# Patient Record
(demographics unavailable — no encounter records)

---

## 2025-04-29 NOTE — PROCEDURE
[Large Joint Injection] : Large joint injection [Bilateral] : bilaterally of the [Knee] : knee [Pain] : pain [Alcohol] : alcohol [Betadine] : betadine [Ethyl Chloride sprayed topically] : ethyl chloride sprayed topically [Sterile technique used] : sterile technique used [Euflexxa(20mg)] : 20mg of Euflexxa [#1] : series #1 [] : Patient tolerated procedure well [Patient was advised to rest the joint(s) for ____ days] : patient was advised to rest the joint(s) for [unfilled] days [Altered anatomic landmarks d/t erosive arthritis] : altered anatomic landmarks d/t erosive arthritis [All ultrasound images have been permanently captured and stored accordingly in our picture archiving and communication system] : All ultrasound images have been permanently captured and stored accordingly in our picture archiving and communication system [FreeTextEntry3] : Do not submerge underwater for 24 hours

## 2025-04-29 NOTE — IMAGING
[Bilateral] : knee bilaterally [FreeTextEntry9] : Reviewed and interpreted.  Right knee AP standing, lateral, sunrise views-moderate degenerative changes with narrowing of the medial compartment on AP standing view, spurring patellofemoral joint  Reviewed and interpreted.  Left knee AP standing, lateral, sunrise views-mild to moderate degenerative changes with narrowing of the medial compartment on AP standing view, spurring patellofemoral joint [de-identified] : Mild dystrophic gait  Cervical spine Inspection normal Good active range of motion without pain Palpable area of fullness right mid neck region area measuring 3 x 3 cm.  No tenderness  Right knee No swelling Mild medial facet and joint line tenderness Passive range of motion 0 degrees to 125 degrees  Right leg No swelling Calf is soft and nontender  Left knee No swelling Mild medial facet and joint line tenderness Passive range of motion 0 degrees to 125 degrees  Left leg No swelling Calf is soft and nontender

## 2025-04-29 NOTE — DISCUSSION/SUMMARY
[de-identified] : Ice prn Tylenol prn.  Advil prn Risk benefits and alternatives of prescribed medication(s) were discussed with the patient. Side effects were discussed including risks of GI irritation and bleeding. Questions were answered. Discontinue medication if any issues. To call me if any questions or concerns He will avoid irritating activities Again, I discussed Euflexxa injections for his knees. Risks including infection, swelling, stiffness, bleeding in addition to other associated risks with joint injection, benefits and alternatives were discussed with the patient He would like to do this He will have MRI of the soft tissues of the neck  Impression: Right cervical mass Moderate osteoarthritis right knee Mild to moderate osteoarthritis left knee

## 2025-04-29 NOTE — HISTORY OF PRESENT ILLNESS
[Gradual] : gradual [4] : 4 [Dull/Aching] : dull/aching [Radiating] : radiating [Leisure] : leisure [Rest] : rest [Stairs] : stairs [Retired] : Work status: retired [1] : 1 [Euflexxa] : Euflexxa [de-identified] : The patient has continued pain in both knees..  The right knee bothers him more than the left.  He has mild pain standing and walking.  Mild to moderate pain with stairs movie sign.   The patient has noticed soft tissue mass in his right posterior neck region over the past several months.  No pain in this region. [] : Post Surgical Visit: no [FreeTextEntry1] : B Knee  [FreeTextEntry7] : B Lower Legs  [de-identified] : 1/10/2024 [de-identified] : Dr. Rojas

## 2025-05-06 NOTE — HISTORY OF PRESENT ILLNESS
[Gradual] : gradual [4] : 4 [Dull/Aching] : dull/aching [Radiating] : radiating [Leisure] : leisure [Rest] : rest [Stairs] : stairs [Retired] : Work status: retired [2] : 2 [Euflexxa] : Euflexxa [de-identified] : The patient feels little better after the first Euflexxa injections in both knees.  He has mild pain standing and walking [] : Post Surgical Visit: no [FreeTextEntry1] : B Knee  [FreeTextEntry7] : B Lower Legs  [de-identified] : 1/10/2024 [de-identified] : Dr. Rojas  [de-identified] : 4/29/2025

## 2025-05-06 NOTE — PROCEDURE
[Large Joint Injection] : Large joint injection [Bilateral] : bilaterally of the [Knee] : knee [Pain] : pain [Alcohol] : alcohol [Betadine] : betadine [Ethyl Chloride sprayed topically] : ethyl chloride sprayed topically [Sterile technique used] : sterile technique used [Euflexxa(20mg)] : 20mg of Euflexxa [] : Patient tolerated procedure well [Patient was advised to rest the joint(s) for ____ days] : patient was advised to rest the joint(s) for [unfilled] days [Altered anatomic landmarks d/t erosive arthritis] : altered anatomic landmarks d/t erosive arthritis [All ultrasound images have been permanently captured and stored accordingly in our picture archiving and communication system] : All ultrasound images have been permanently captured and stored accordingly in our picture archiving and communication system [#2] : series #2 [FreeTextEntry3] : Do not submerge underwater for 24 hours

## 2025-05-06 NOTE — DISCUSSION/SUMMARY
[de-identified] : Ice prn Tylenol prn.  Advil prn Risk benefits and alternatives of prescribed medication(s) were discussed with the patient. Side effects were discussed including risks of GI irritation and bleeding. Questions were answered. Discontinue medication if any issues. To call me if any questions or concerns He will avoid irritating activities He will have MRI of the soft tissues of the neck.  He is scheduled to have this done tomorrow  Impression: Right cervical mass Moderate osteoarthritis right knee Mild to moderate osteoarthritis left knee

## 2025-05-06 NOTE — IMAGING
[de-identified] : Mild dystrophic gait  Right knee No swelling Mild medial facet and joint line tenderness Passive range of motion 0 degrees to 125 degrees  Right leg No swelling Calf is soft and nontender  Left knee No swelling Mild medial facet and joint line tenderness Passive range of motion 0 degrees to 125 degrees  Left leg No swelling Calf is soft and nontender

## 2025-05-13 NOTE — PROCEDURE
[Large Joint Injection] : Large joint injection [Bilateral] : bilaterally of the [Knee] : knee [Pain] : pain [Alcohol] : alcohol [Betadine] : betadine [Ethyl Chloride sprayed topically] : ethyl chloride sprayed topically [Sterile technique used] : sterile technique used [Euflexxa(20mg)] : 20mg of Euflexxa [] : Patient tolerated procedure well [Patient was advised to rest the joint(s) for ____ days] : patient was advised to rest the joint(s) for [unfilled] days [Altered anatomic landmarks d/t erosive arthritis] : altered anatomic landmarks d/t erosive arthritis [All ultrasound images have been permanently captured and stored accordingly in our picture archiving and communication system] : All ultrasound images have been permanently captured and stored accordingly in our picture archiving and communication system [#3] : series #3 [FreeTextEntry3] : Do not submerge underwater for 24 hours Left knee injection was given medially

## 2025-05-13 NOTE — DATA REVIEWED
[MRI] : MRI [Cervical Spine] : cervical spine [I independently reviewed and interpreted images and report] : I independently reviewed and interpreted images and report [FreeTextEntry1] : MRI of the neck, soft tissues done at [facility] May 7, 2025 was reviewed. There was no evidence of mass right posterior neck region. There is multilevel degenerative disc disease. Mild broad disc/osteophyte at C3-4 with mild stenosis. Mild broad disc/osteophyte at C5-6. Mild to moderate provide disc/osteophyte at C6-7 with moderate stenosis

## 2025-05-13 NOTE — HISTORY OF PRESENT ILLNESS
[Gradual] : gradual [4] : 4 [Dull/Aching] : dull/aching [Radiating] : radiating [Leisure] : leisure [Rest] : rest [Stairs] : stairs [Retired] : Work status: retired [3] : 3 [Euflexxa] : Euflexxa [de-identified] : The patient feels little better after the second Euflexxa injections in both knees.  He has mild pain standing and walking He had MRI cervical spine.  He denies any pain in his neck.  No radicular complaints [] : Post Surgical Visit: no [FreeTextEntry1] : B Knee  [FreeTextEntry7] : B Lower Legs  [de-identified] : 1/10/2024 [de-identified] : Dr. Rojas  [de-identified] : 5/06/25

## 2025-05-13 NOTE — DISCUSSION/SUMMARY
[de-identified] : MRI of the soft tissues of the cervical spine was discussed with the patient He will return prn if he develops any neck pain or develops any increased swelling Ice prn Tylenol prn.  Advil prn Risk benefits and alternatives of prescribed medication(s) were discussed with the patient. Side effects were discussed including risks of GI irritation and bleeding. Questions were answered. Discontinue medication if any issues. To call me if any questions or concerns He will avoid irritating activities   Impression: Cervical spondylosis Moderate osteoarthritis right knee Mild to moderate osteoarthritis left knee

## 2025-05-13 NOTE — IMAGING
[de-identified] : Mild dystrophic gait   Cervical spine Inspection normal Good active range of motion without pain Palpable area of fullness right mid neck region area measuring 3 x 3 cm. No tenderness   Right knee No swelling Mild medial facet and joint line tenderness Passive range of motion 0 degrees to 125 degrees  Right leg No swelling Calf is soft and nontender  Left knee No swelling.  4 mm area of purplish discoloration lateral knee in region of prior injection Mild medial facet and joint line tenderness Passive range of motion 0 degrees to 125 degrees  Left leg No swelling Calf is soft and nontender

## 2025-06-24 NOTE — IMAGING
[de-identified] : Mild dystrophic gait  Lumbosacral spine Inspection-normal Tenderness-mild tenderness paraspinals bilaterally Straight leg raising-negative bilaterally Motor exam lower extremities-normal  Right hip Full painless passive range of motion. No tenderness  Left hip No swelling Full painless passive range of motion.  Mild tenderness posterior gluteal region  Right knee No swelling Mild medial facet and joint line tenderness Passive range of motion 0 degrees to 125 degrees   Left knee No swelling.  Mild medial facet tenderness Passive range of motion 0 degrees to 125 degrees  Both legs No swelling Calves are soft and nontender Posterior tibial pulse 2+ bilaterally  [FreeTextEntry1] : Reviewed and interpreted.  Lumbosacral spine AP and lateral views-mild to moderate multilevel disc space narrowing.  Anterior bridging osteophytes [de-identified] : Reviewed and interpreted.  Pelvis AP with lateral both hips-mild degenerative changes of the hips bilaterally

## 2025-06-24 NOTE — DISCUSSION/SUMMARY
[de-identified] : Ice prn Tylenol prn.  Advil with food prn Risk benefits and alternatives of prescribed medication(s) were discussed with the patient. Side effects were discussed including risks of GI irritation and bleeding. Questions were answered. Discontinue medication if any issues. To call me if any questions or concerns He will have MRIs of the lumbosacral spine and pelvis Referred to Tevin Monteiro at [facility] for therapy program   Impression: Lumbosacral strain/spondylosis Gluteal tendinitis left hip Moderate osteoarthritis right knee Mild to moderate osteoarthritis left knee

## 2025-06-24 NOTE — REASON FOR VISIT
[FreeTextEntry2] : Decreased pain in both knees Pain lower back and left gluteal region over the past several months

## 2025-06-24 NOTE — HISTORY OF PRESENT ILLNESS
[Lower back] : lower back [Left Leg] : left leg [Right Leg] : right leg [Gradual] : gradual [4] : 4 [1] : 2 [Burning] : burning [Dull/Aching] : dull/aching [Radiating] : radiating [Sharp] : sharp [Retired] : Work status: retired [de-identified] : The patient says his knees are feeling better after completing Euflexxa injections.  The right knee bothers him more than the left.  He has mild pain standing and walking occasionally.  Pain after sitting and getting up The patient has had pain in his lower back and posterior left gluteal region over the past several months.  He had fall with injury to his left posterior hip several years ago. He has mild to moderate pain with change of position.  Occasional pain rating to his thighs with burning.  Occasional pain awakening him from sleep at night.  No numbness or weakness in lower extremities [] : Post Surgical Visit: no [FreeTextEntry7] : B Legs

## 2025-07-23 NOTE — ASSESSMENT
[FreeTextEntry1] : Diabetes/obesity-most recent A1c was 5.5. For now he will continue Zepbound 7.5 mg weekly.  Hyperlipidemia-cholesterol is elevated once again off medication. He is going to restart Crestor 10 mg and Nexletol.  Nexletol was started because of possible myalgia from higher doses of rosuvastatin.  Chronic depression-has been on Cymbalta 60 mg daily for years and doing well with it.  Sleep apnea/CPAP  Asthma-does use Singulair 10 mg daily.  Last colonoscopy was in 2020 and that was a normal exam.  5-year follow-up was suggested. [Vaccines Reviewed] : Immunizations reviewed today. Please see immunization details in the vaccine log within the immunization flowsheet.

## 2025-07-23 NOTE — PHYSICAL EXAM
[No Acute Distress] : no acute distress [No JVD] : no jugular venous distention [Clear to Auscultation] : lungs were clear to auscultation bilaterally [Regular Rhythm] : with a regular rhythm [Normal S1, S2] : normal S1 and S2 [No Murmur] : no murmur heard [No Edema] : there was no peripheral edema [No Focal Deficits] : no focal deficits [Alert and Oriented x3] : oriented to person, place, and time [de-identified] : Benign

## 2025-07-23 NOTE — HISTORY OF PRESENT ILLNESS
[de-identified] : 65-year-old male presents for annual wellness visit, review fasting labs and medication management. He has a history of prediabetes, hyperlipidemia, asthma, sleep apnea/CPAP, long-term depression and obesity. Has been on Zepbound since 5/25 she was cardiologist and has lost approximately 30 pounds. Was on rosuvastatin and Nexletol for hyperlipidemia but stopped them a few months ago to see if his cholesterol could be controlled with diet and weight loss. Has been generally well without recent illness. Last colonoscopy was in 2020. He is /male partner.  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

## 2025-07-23 NOTE — HEALTH RISK ASSESSMENT
[Monthly or less (1 pt)] : Monthly or less (1 point) [1 or 2 (0 pts)] : 1 or 2 (0 points) [Never (0 pts)] : Never (0 points) [No] : In the past 12 months have you used drugs other than those required for medical reasons? No [No falls in past year] : Patient reported no falls in the past year [0] : 2) Feeling down, depressed, or hopeless: Not at all (0) [PHQ-2 Negative - No further assessment needed] : PHQ-2 Negative - No further assessment needed [Audit-CScore] : 1 [[id number]] : 0 [Yes] : Reviewed medication list for presence of high-risk medications. [Opioids] : opioids [Never] : Never [Patient reported colonoscopy was normal] : Patient reported colonoscopy was normal [Change in mental status noted] : No change in mental status noted [Language] : denies difficulty with language [Behavior] : denies difficulty with behavior [Learning/Retaining New Information] : denies difficulty learning/retaining new information [Handling Complex Tasks] : denies difficulty handling complex tasks [Reasoning] : denies difficulty with reasoning [Spatial Ability and Orientation] : denies difficulty with spatial ability and orientation [None] : None [With Significant Other] : lives with significant other [Retired] : retired [College] : College [] :  [Feels Safe at Home] : Feels safe at home [Fully functional (bathing, dressing, toileting, transferring, walking, feeding)] : Fully functional (bathing, dressing, toileting, transferring, walking, feeding) [Fully functional (using the telephone, shopping, preparing meals, housekeeping, doing laundry, using] : Fully functional and needs no help or supervision to perform IADLs (using the telephone, shopping, preparing meals, housekeeping, doing laundry, using transportation, managing medications and managing finances) [Reports changes in hearing] : Reports no changes in hearing [Reports changes in vision] : Reports no changes in vision [Reports normal functional visual acuity (ie: able to read med bottle)] : Reports normal functional visual acuity [Reports changes in dental health] : Reports no changes in dental health [Smoke Detector] : smoke detector [Seat Belt] :  uses seat belt [Travel to Developing Areas] : does not  travel to developing areas [TB Exposure] : is not being exposed to tuberculosis [ColonoscopyDate] : 06/20

## 2025-07-30 NOTE — DATA REVIEWED
[Lumbar Spine] : lumbar spine [MRI] : MRI [Pelvis] : pelvis [I independently reviewed and interpreted images and report] : I independently reviewed and interpreted images and report [FreeTextEntry1] : MRI lumbosacral spine done in [facility] July 7, 2025 was reviewed. There is multilevel degenerative disc disease. Mild stenosis at L4-5 [FreeTextEntry2] : MRI of the pelvis done July 7, 2025 was reviewed. There is mild bilateral trochanteric bursitis. Also reported as asymmetric fat versus poorly encapsulated lipoma along distal portion of proximal left piriformis muscle. Reported is measuring 2.3 x 1.9 x 1.6 cm. No edema or atrophy

## 2025-07-30 NOTE — IMAGING
[de-identified] : Reviewed and interpreted.  Pelvis AP with lateral both hips-mild degenerative changes of the hips bilaterally [Left] : left elbow [de-identified] : Mild dystrophic gait  Left elbow No swelling Full active motion Palpable clicking with active flexion extension, pronation supination Mild tenderness posterolateral elbow Collaterals are stable Radial pulse 2+   Lumbosacral spine Inspection-normal Tenderness-no tenderness Straight leg raising-negative bilaterally Motor exam lower extremities-normal  Right hip Full painless passive range of motion. No tenderness  Left hip No swelling Full painless passive range of motion.  No tenderness   Both legs No swelling Calves are soft and nontender Posterior tibial pulse 2+ bilaterally  [FreeTextEntry1] : Reviewed and interpreted.  Left elbow AP, lateral and oblique views-minimal degenerative changes.  Small ossicle posterior olecranon

## 2025-07-30 NOTE — IMAGING
[de-identified] : Reviewed and interpreted.  Pelvis AP with lateral both hips-mild degenerative changes of the hips bilaterally [Left] : left elbow [de-identified] : Mild dystrophic gait  Left elbow No swelling Full active motion Palpable clicking with active flexion extension, pronation supination Mild tenderness posterolateral elbow Collaterals are stable Radial pulse 2+   Lumbosacral spine Inspection-normal Tenderness-no tenderness Straight leg raising-negative bilaterally Motor exam lower extremities-normal  Right hip Full painless passive range of motion. No tenderness  Left hip No swelling Full painless passive range of motion.  No tenderness   Both legs No swelling Calves are soft and nontender Posterior tibial pulse 2+ bilaterally  [FreeTextEntry1] : Reviewed and interpreted.  Left elbow AP, lateral and oblique views-minimal degenerative changes.  Small ossicle posterior olecranon

## 2025-07-30 NOTE — HISTORY OF PRESENT ILLNESS
[Lower back] : lower back [Left Leg] : left leg [Right Leg] : right leg [Gradual] : gradual [1] : 2 [Burning] : burning [Dull/Aching] : dull/aching [Radiating] : radiating [Sharp] : sharp [Retired] : Work status: retired [de-identified] : The patient says that since last visit his lower back and left hip pain has improved.  He has minimal pain in his lower back and left posterior gluteal region with activities.  No radicular complaints.  No numbness or weakness in his lower extremities.  No awakening from sleep at night.  He had MRIs lumbosacral spine and pelvis The patient has had pain in his left elbow over the past several months.  No injury.  He has clicking associated with the pain.  No catching or locking.  Pain with pronation and supination.  Pain when lifting and grasping. [] : Post Surgical Visit: no [FreeTextEntry5] : Patient returns for follow up, has MRI done [FreeTextEntry7] : B Legs [de-identified] : MRI

## 2025-07-30 NOTE — DATA REVIEWED
[Lumbar Spine] : lumbar spine [MRI] : MRI [Pelvis] : pelvis [I independently reviewed and interpreted images and report] : I independently reviewed and interpreted images and report [FreeTextEntry1] : MRI lumbosacral spine done in [facility] July 7, 2025 was reviewed. There is multilevel degenerative disc disease. Mild stenosis at L4-5 [FreeTextEntry2] : MRI of the pelvis done July 7, 2025 was reviewed. There is mild bilateral trochanteric bursitis. Also reported as asymmetric fat versus poorly encapsulated lipoma along distal portion of proximal left piriformis muscle. Reported is measuring 2.3 x 1.9 x 1.6 cm. No edema or atrophy

## 2025-07-30 NOTE — DISCUSSION/SUMMARY
[de-identified] : MRIs lumbosacral spine and pelvis were discussed with the patient He will have MRI left hip with contrast to evaluate for possible lipoma left hip musculature He will have MRI left elbow Ice prn Tylenol prn.  Advil with food prn Risk benefits and alternatives of prescribed medication(s) were discussed with the patient. Side effects were discussed including risks of GI irritation and bleeding. Questions were answered. Discontinue medication if any issues. To call me if any questions or concerns Since his back and hip are feeling better he did not want to do physical therapy at the present time  Impression: Pain and clicking left elbow, possible mild osteoarthritis Lumbosacral strain/spondylosis Possible lipoma left hip

## 2025-07-30 NOTE — PHYSICAL EXAM
[Normal Mood and Affect] : normal mood and affect [Able to Communicate] : able to communicate [Well Developed] : well developed [Well Nourished] : well nourished within normal limits

## 2025-07-30 NOTE — HISTORY OF PRESENT ILLNESS
[Lower back] : lower back [Left Leg] : left leg [Right Leg] : right leg [Gradual] : gradual [1] : 2 [Burning] : burning [Dull/Aching] : dull/aching [Radiating] : radiating [Sharp] : sharp [Retired] : Work status: retired [de-identified] : The patient says that since last visit his lower back and left hip pain has improved.  He has minimal pain in his lower back and left posterior gluteal region with activities.  No radicular complaints.  No numbness or weakness in his lower extremities.  No awakening from sleep at night.  He had MRIs lumbosacral spine and pelvis The patient has had pain in his left elbow over the past several months.  No injury.  He has clicking associated with the pain.  No catching or locking.  Pain with pronation and supination.  Pain when lifting and grasping. [] : Post Surgical Visit: no [FreeTextEntry5] : Patient returns for follow up, has MRI done [FreeTextEntry7] : B Legs [de-identified] : MRI

## 2025-07-30 NOTE — DISCUSSION/SUMMARY
[de-identified] : MRIs lumbosacral spine and pelvis were discussed with the patient He will have MRI left hip with contrast to evaluate for possible lipoma left hip musculature He will have MRI left elbow Ice prn Tylenol prn.  Advil with food prn Risk benefits and alternatives of prescribed medication(s) were discussed with the patient. Side effects were discussed including risks of GI irritation and bleeding. Questions were answered. Discontinue medication if any issues. To call me if any questions or concerns Since his back and hip are feeling better he did not want to do physical therapy at the present time  Impression: Pain and clicking left elbow, possible mild osteoarthritis Lumbosacral strain/spondylosis Possible lipoma left hip

## 2025-07-30 NOTE — REASON FOR VISIT
[FreeTextEntry2] : Decreased pain lower back and left gluteal region Pain left elbow over the past several months with clicking